# Patient Record
Sex: FEMALE | URBAN - METROPOLITAN AREA
[De-identification: names, ages, dates, MRNs, and addresses within clinical notes are randomized per-mention and may not be internally consistent; named-entity substitution may affect disease eponyms.]

---

## 2024-09-20 ENCOUNTER — OFFICE VISIT (OUTPATIENT)
Dept: URBAN - METROPOLITAN AREA CLINIC 113 | Facility: CLINIC | Age: 54
End: 2024-09-20
Payer: COMMERCIAL

## 2024-09-20 ENCOUNTER — LAB OUTSIDE AN ENCOUNTER (OUTPATIENT)
Dept: URBAN - METROPOLITAN AREA CLINIC 113 | Facility: CLINIC | Age: 54
End: 2024-09-20

## 2024-09-20 ENCOUNTER — DASHBOARD ENCOUNTERS (OUTPATIENT)
Age: 54
End: 2024-09-20

## 2024-09-20 VITALS
SYSTOLIC BLOOD PRESSURE: 122 MMHG | DIASTOLIC BLOOD PRESSURE: 84 MMHG | HEIGHT: 68 IN | RESPIRATION RATE: 14 BRPM | WEIGHT: 169 LBS | HEART RATE: 76 BPM | BODY MASS INDEX: 25.61 KG/M2 | TEMPERATURE: 97.3 F

## 2024-09-20 DIAGNOSIS — R10.32 LLQ ABDOMINAL PAIN: ICD-10-CM

## 2024-09-20 DIAGNOSIS — R19.4 CHANGE IN BOWEL HABIT: ICD-10-CM

## 2024-09-20 DIAGNOSIS — Z12.11 COLON CANCER SCREENING: ICD-10-CM

## 2024-09-20 DIAGNOSIS — K59.09 CHRONIC CONSTIPATION: ICD-10-CM

## 2024-09-20 PROCEDURE — 99204 OFFICE O/P NEW MOD 45 MIN: CPT | Performed by: NURSE PRACTITIONER

## 2024-09-20 RX ORDER — ATORVASTATIN CALCIUM 80 MG/1
1 TABLET TABLET, FILM COATED ORAL ONCE A DAY
Status: ACTIVE | COMMUNITY

## 2024-09-20 RX ORDER — FLUOXETINE HYDROCHLORIDE 40 MG/1
1 CAPSULE CAPSULE ORAL ONCE A DAY
Status: ACTIVE | COMMUNITY

## 2024-09-20 NOTE — HPI-TODAY'S VISIT:
53 yo woman presenting for evaluation of left lower abdominal pain and constipation. She was referred by Dr. Luther Augustin for colon cancer screening in April 2024, though has not yet been seen in the office or screened for DAC.  She has been having constipation predominant bowel habits for the last two weeks. She started a keto diet about 2 weeks ago. She was eating a bunch of nuts. Then she started with LLQ pain. Her stool consistency is variable, sometimes soft, sometimes liquid, and sometimes thin caliber.  She is not having abdominal pain currently. Her last bowel movement was yesterday. She has tenesmus but is unable to evacuate any stool when she goes to the restroom. She has some bloating with need for BM.  Stools are normal color: no blood or melena. She is not losing weight unintentionally.   There is no family history of colon cancer. She has never before had a colonoscopy. She denies any history of lung disease, need for supplemental oxygen.  She denies any history of cardiac disease, stent placement, need for blood thinning medications, history of pacemaker or defibrillator. There is no history of chronic kidney disease. No history of neurologic disease or seizure disorder.

## 2024-09-21 LAB
A/G RATIO: 1.8
ALBUMIN: 4.6
ALKALINE PHOSPHATASE: 106
ALT (SGPT): 18
AST (SGOT): 21
BILIRUBIN, TOTAL: 0.4
BUN/CREATININE RATIO: (no result)
BUN: 9
CALCIUM: 10
CARBON DIOXIDE, TOTAL: 27
CHLORIDE: 102
CREATININE: 0.64
EGFR: 104
GLOBULIN, TOTAL: 2.5
GLUCOSE: 90
HEMATOCRIT: 41.2
HEMOGLOBIN: 13.6
MCH: 30.2
MCHC: 33
MCV: 91.6
MPV: 10.2
PLATELET COUNT: 321
POTASSIUM: 4
PROTEIN, TOTAL: 7.1
RDW: 12.3
RED BLOOD CELL COUNT: 4.5
SODIUM: 141
TSH W/REFLEX TO FT4: 1.26
WHITE BLOOD CELL COUNT: 5.9

## 2024-09-23 ENCOUNTER — TELEPHONE ENCOUNTER (OUTPATIENT)
Dept: URBAN - METROPOLITAN AREA CLINIC 113 | Facility: CLINIC | Age: 54
End: 2024-09-23

## 2024-09-23 ENCOUNTER — OFFICE VISIT (OUTPATIENT)
Dept: URBAN - METROPOLITAN AREA CLINIC 113 | Facility: CLINIC | Age: 54
End: 2024-09-23

## 2024-10-10 ENCOUNTER — TELEPHONE ENCOUNTER (OUTPATIENT)
Dept: URBAN - METROPOLITAN AREA CLINIC 113 | Facility: CLINIC | Age: 54
End: 2024-10-10

## 2024-10-10 ENCOUNTER — LAB OUTSIDE AN ENCOUNTER (OUTPATIENT)
Dept: URBAN - METROPOLITAN AREA CLINIC 113 | Facility: CLINIC | Age: 54
End: 2024-10-10

## 2024-11-20 ENCOUNTER — OFFICE VISIT (OUTPATIENT)
Dept: URBAN - METROPOLITAN AREA CLINIC 113 | Facility: CLINIC | Age: 54
End: 2024-11-20
Payer: COMMERCIAL

## 2024-11-20 ENCOUNTER — LAB OUTSIDE AN ENCOUNTER (OUTPATIENT)
Dept: URBAN - METROPOLITAN AREA CLINIC 113 | Facility: CLINIC | Age: 54
End: 2024-11-20

## 2024-11-20 VITALS
TEMPERATURE: 97.7 F | BODY MASS INDEX: 25.64 KG/M2 | DIASTOLIC BLOOD PRESSURE: 75 MMHG | SYSTOLIC BLOOD PRESSURE: 119 MMHG | RESPIRATION RATE: 14 BRPM | HEIGHT: 68 IN | HEART RATE: 74 BPM | WEIGHT: 169.2 LBS

## 2024-11-20 DIAGNOSIS — R10.32 LLQ ABDOMINAL PAIN: ICD-10-CM

## 2024-11-20 DIAGNOSIS — R93.2 ABNORMAL LIVER CT: ICD-10-CM

## 2024-11-20 DIAGNOSIS — K59.09 CHRONIC CONSTIPATION: ICD-10-CM

## 2024-11-20 DIAGNOSIS — K76.9 HEPATIC LESION: ICD-10-CM

## 2024-11-20 DIAGNOSIS — Z12.11 COLON CANCER SCREENING: ICD-10-CM

## 2024-11-20 PROCEDURE — 99213 OFFICE O/P EST LOW 20 MIN: CPT | Performed by: NURSE PRACTITIONER

## 2024-11-20 RX ORDER — FLUOXETINE HYDROCHLORIDE 40 MG/1
1 CAPSULE CAPSULE ORAL ONCE A DAY
Status: ACTIVE | COMMUNITY

## 2024-11-20 RX ORDER — ATORVASTATIN CALCIUM 80 MG/1
1 TABLET TABLET, FILM COATED ORAL ONCE A DAY
Status: ACTIVE | COMMUNITY

## 2024-11-20 NOTE — HPI-TODAY'S VISIT:
54-year-old woman presenting for follow-up after labs and imaging performed for complaints of left lower quadrant abdominal pain and constipation predominant changes in bowel habits likely on the basis of a functional bowel disorder, exacerbated by dietary changes (keto diet).  Labs 9/20/2024 revealed a normal CBC, CMP and TSH.  CT of the abdomen and pelvis performed 10/9/2024 was notable for a subcentimeter hypodensity in the posterior right lobe of the liver, stable in comparison to previous examination.  Enhancing lesion in the left lobe of the liver measuring 2 cm in diameter not seen on prior exam.  No suspicious hepatic mass.  MRI was recommended to further characterize the lesion.  MRI of the abdomen and pelvis with and without contrast performed 10/28/2024 was unremarkable for 2 cm enhancing left hepatic lobe lesion noted on prior outside CT report.  No liver neoplasm in the area appreciated.  This is suggestive that findings on the prior CT were related to a benign transient hepatic attenuation difference.  There is a 1 cm benign right hepatic hemangioma.  No concerning or additional liver lesions on follow-up examination.  There is benign-appearing mild extrahepatic biliary tree prominence without intrahepatic biliary dilation or visualized calculus filling defect, neoplasm or stricture to suggest concern.  She is doing well. She is having regular bowel movements currently. Bowel movements resolved with the bowel cleanse. She is no longer following the keto diet.  No abdominal pain or bloating.  She denies any history of lung disease, need for supplemental oxygen. She denies any history of cardiac disease, stent placement, need for blood thinning medications, history of pacemaker or defibrillator. There is no history of chronic kidney disease or need for dialysis. No history of neurologic disease or seizure disorder. She is not taking any medication for diabetes or weight loss, including GLP-1 agonists, SGLT-2 or phentermine.

## 2025-01-02 ENCOUNTER — OFFICE VISIT (OUTPATIENT)
Dept: URBAN - METROPOLITAN AREA MEDICAL CENTER 40 | Facility: MEDICAL CENTER | Age: 55
End: 2025-01-02

## 2025-06-09 ENCOUNTER — TELEPHONE ENCOUNTER (OUTPATIENT)
Dept: URBAN - METROPOLITAN AREA CLINIC 113 | Facility: CLINIC | Age: 55
End: 2025-06-09

## 2025-07-31 ENCOUNTER — OFFICE VISIT (OUTPATIENT)
Dept: URBAN - METROPOLITAN AREA CLINIC 72 | Facility: CLINIC | Age: 55
End: 2025-07-31

## 2025-08-06 ENCOUNTER — OFFICE VISIT (OUTPATIENT)
Dept: URBAN - METROPOLITAN AREA CLINIC 72 | Facility: CLINIC | Age: 55
End: 2025-08-06